# Patient Record
(demographics unavailable — no encounter records)

---

## 2024-12-18 NOTE — HISTORY OF PRESENT ILLNESS
[FreeTextEntry1] : 33 yo  presents to office after spontaneous ab    LMP 10/08/24  Patient seen twice in ED at [facility] [place]   24 with spotting and feeling ill.  US no IUP noted Patient returned on 24 with spotting and then active bleeding and cramping  US demonstrates IUP with no cardiac activity.  ED doctor removed tissue from cervix and sent to pathology.  Results pending.  Patient reports increased bleeding on 24 and cramping.  Passed large clots and tissue on .  Specimen brought to office.  Appears large clot.  Patient reports only spotting today  Denies fever/chills, nausea/emesis, dyspnea, or dizziness. Some cramping.  Previous CD x 2      Suspect completed Ab     Would recommend no surgical intervention (suction d&c) unless there is retained POCs and heavy, excessive bleeding  Patient agrees to lab work   hcg level, cbc, and type & Rh Tissue to pathology  Pelvic US to re evaluate uterus and adnexa  Patient instructed to take Tylenol 1 gram po q 8 hours for cramping  Strict ER warnings are given

## 2024-12-18 NOTE — PLAN
[FreeTextEntry1] : lab work:  hcg level, type & Rh, CBC tissue to pathology Pelvic US to evaluate uterus/adnexa/endometrial cavity  ER warnings given  pelvic rest  follow up Gyn after US to confirm no retained POCs   Discuss BCM at visit  Patient is aware I will be on call at [facility] this weekend   Return to ED if concerning sign/symptoms or heavy bleeding

## 2024-12-18 NOTE — PHYSICAL EXAM
[Appropriately responsive] : appropriately responsive [Alert] : alert [No Acute Distress] : no acute distress [No Lymphadenopathy] : no lymphadenopathy [Regular Rate Rhythm] : regular rate rhythm [No Murmurs] : no murmurs [Clear to Auscultation B/L] : clear to auscultation bilaterally [Soft] : soft [Non-tender] : non-tender [Non-distended] : non-distended [No HSM] : No HSM [No Lesions] : no lesions [No Mass] : no mass [Oriented x3] : oriented x3 [FreeTextEntry1] : Pelvic Exam deferred

## 2025-02-07 NOTE — HISTORY OF PRESENT ILLNESS
[FreeTextEntry1] : 36 yo s/p spontaneous Pelvic US 1/15/25 notes normal ovaries and on retained POCs  Patient reports feeling well.  LMP 1/8/25 was and strong cramping  Denies current bleeding or discharge or complaints  Patient does not use tobacco products   BCM discussed   Patient agrees to OCPs  Will start with 1st day of menses